# Patient Record
(demographics unavailable — no encounter records)

---

## 2025-06-18 NOTE — HISTORY OF PRESENT ILLNESS
[Back Pain] : back pain [Joint Pain] : joint  [5] : an average pain level of 5/10 [10] : a maximum pain level of 10/10 [Burning] : burning [Stabbing] : stabbing [Sitting] : sitting [Standing] : standing [Walking] : walking [Home] : at home, [unfilled] , at the time of the visit. [Medical Office: ([facility])___] : at the medical office located in  [Verbal consent obtained from patient] : the patient, [unfilled] [FreeTextEntry1] : Interval Note:  Since last visit the pain is not  improved.  Continues to have significant pain in neck and shoulder, weakness in legs and arms. Continues gabapentin.  Reports bilateral knee pain. Denies any additional  bowel/bladder dysfunction.   HPI  Ms. GRAZYNA BEAULIEU is a 89 year F pmhx htn with significant back and knee pain.  Patient has been suffering for many years.  Has had interventions without significant improvement.  However gabapentin 400mg TID has been helpful.  Pain is so bad that patient finds it difficult to perform adls and ambulate. denies any worsening numbness, weakness, bowel/bladder dysfunction. Complains of neck pain radiating to left arm, tingling.   Previous and current pain medications/doses/effects:  gabapentin  Previous Pain Treatments:  PT  Previous Pain Injections:  knee injection  Previous Diagnostic Studies/Images:  MRI CS 6/25  Compression and both at C4-5 C5-6 secondary to hypertrophic changes of spondylosis.  This is associated with cord signal abnormality consistent with myelomalacia vs edema.  Worse at C4-5

## 2025-06-18 NOTE — ASSESSMENT
[FreeTextEntry1] : >> Imaging and Other Studies   I personally reviewed the relevant imaging.  Discussed and explained to patient the likely source of pathology and pain.  Questions answered.  >> Therapy and Other Modalities   PT  >> Medications   acetaminophen 650mg q8h prn pain (caution <3g daily)  gabapentin 400mg to BID cautioned change in mood.  Encouraged to call with any worsening mood or depression/suicidal ideations  >> Interventions   Progressive left knee pain secondary to osteoarthritis refractory to conservative treatments, will schedule left knee steroid injection r/b/a discussed  Progressive right knee pain secondary to osteoarthritis refractory to conservative treatments, will schedule right knee steroid injection r/b/a discussed   >> Consults   referral to neurosurgery for surgical evaluation  >> Discussion of Risks/Benefits/Alternatives    >Regarding any scheduled procedures:   In the event the patient is scheduled for a procedure,   I have discussed in detail with the patient that any interventional pain procedure is associated with potential risks.  The procedure may include an injection of steroids and potentially other medications (local anesthetic and normal saline) into the epidural space or surrounding tissue of the spine.  There are significant risks of this procedure which include and are not limited to infection, bleeding, worsening pain, dural puncture leading to postdural puncture headache, nerve damage, spinal cord injury, paralysis, stroke, and death.   There is a chance that the procedure does not improve their pain.   There are risks associated with the steroid being absorbed into the body systemically.  These include dysphoria, difficulty sleeping, mood swings and personality changes.  Premenopausal women may notice an irregularity in her menstrual cycle for 2-3 months following the injection.  Steroids can specifically affect patients with hypertension, diabetes, and peptic ulcers.  The procedure may cause a temporary increase in blood pressure and blood pressure, and may adversely affect a peptic ulcer.  Other, more rare complications, include avascular necrosis of joints, glaucoma and worsening of osteoporosis.   I have discussed the risks of the procedure at length with the patient, and the potential benefits of pain relief.  I have offered alternatives to the procedure.  All questions were answered.   The patient expressed understanding and wishes to proceed with the procedure.   > Longitudinal management of Complex Painful condition   The patient is being managed for a complex condition that requires ongoing management.  The nature of this condition demands nuanced approach to treatment.  The seriousness of the condition necessitates an in-depth and focused approach to management and coordination with other healthcare professionals.    This visit involves intricate evaluation and management of the patient's condition.  The complexity of the visit was due to the need for detailed assessment of the current state, consideration of potential complications and a careful balancing of treatment options to management the chronic condition effectively.   As detailed above, the patient has a chronic significant painful condition that requires regular and detailed management.  The condition's impact on the patient's quality of life and health is substantial and necessitates a comprehensive and tailored approach   >> Conclusion   There were no barriers to communication. Informed patient that I would be available for any additional questions. Patient was instructed to call with any worsening symptoms including severe pain, new numbness/weakness, or changes in the bowel/bladder function. Discussed role of nsaids in pain management and all relevant risks, if patient is continuing to require after 4 weeks the patient should f/u for alternative treatment. Instructed patient to maintain pain diary to monitor pain level, mobility, and function.   I explained to patient benefits and limitation of TeleMedicine visits  Patient understands that limitations include inability to perform comprehensive physical exam, which may lead to potential diagnostic inconsistencies.    Any scheduled procedures are based on history, imaging and limited physical exam performed on TeleHealth visit.  If necessary, additional focal physical exam will be performed on date of procedure  Patient understands that diagnosis and treatment may be limited by these inconsistencies and patient agrees to proceed with care plan

## 2025-06-18 NOTE — PHYSICAL EXAM
[Normal] : Well developed, in no acute distress, alert and oriented to person, place and time [de-identified] :  Constitutional: Normal, well developed, no acute distress on audio/video examination Eyes: Symmetric, External structures on video examination ENT: Lips, mucosa and tongue normal on video examination Oropharynx: Lips normal, symmetric, no external lesions appreciated appreciated on video examination Respiratory: Non-labored breathing, no audible wheezes appreciated on audio/video examination Vascular: No cyanosis appreciated or edema appreciated on video examination GI:  no jaundice appreciated on video examination Neurovascular: CN grossly intact on video/audio examination, alert MSK: Normal muscle bulk on video examination

## 2025-07-16 NOTE — HISTORY OF PRESENT ILLNESS
[de-identified] : GRAZYNA BEAULIEU is an left handed 89 year old female with a PMH of hypertension, hyperparathyroidism, anemia, CKD stage 3, pre DM, who presents to the office today for neurosurgical consultation due to cervical and lumbar radiculopathy and MRI findings concerning for cervical myelomalacia.   The pain is characterized as burning and stabbing in nature.  It started one year ago. Her mobility decreased in 2023 after she was diagnosed with COVID. She became wheelchair bound and previously used a walker prior. She is not able to walk due to pain in the knees. It is exacerbated by sitting, standing, walking and relieved by no factors.  It radiates throughout arms. Denies neck pain. There has been no known trauma precipitating the pain.  She notes dexterity issues with right hand. The patient denies numbness of extremities and bowel and bladder incontinence. Endorses weakness of extremities, and gait disturbance.  She has tried pain medications including Gabapentin in the last months without relief.  She has undergone imaging in the form of MRI cervical spine which revealed cord compression and both at C4-C5 and C5-C6 secondary to hypertrophic changes of spondylosis. This is associated with cord signal abnormality consistent with myelomalacia vs edema. Worse at C4-C5. Multilevel foraminal stenosis ranging up to severe at multiple levels (see detailed report below) MRI lumbar spine detailed below.   Meds: Gabapentin 400 mg BID, Breo Ellipta, Tylenol, albuterol, montelukast,     Allergies: antivert, aspirin, plavix, potassium chloride, percocet, dramamine

## 2025-07-16 NOTE — PHYSICAL EXAM
[General Appearance - Alert] : alert [General Appearance - In No Acute Distress] : in no acute distress [Fluency] : fluency intact [Comprehension] : comprehension intact [Motor Strength] : muscle strength was normal in all four extremities [Sensation Tactile Decrease] : light touch was intact [Abnormal Walk] : normal gait [3+] : Brachioradialis left 3+ [2+] : Ankle jerk left 2+ [Moody] : Omody's sign was demonstrated [FreeTextEntry6] : right lower extremity 3, left lower extremity 3. bilateral knee pain  [FreeTextEntry8] : wheelchair bound

## 2025-07-16 NOTE — REASON FOR VISIT
[New Patient Visit] : a new patient visit [Interpreters_IDNumber] : [id number] [Interpreters_FullName] : keyonna shafer

## 2025-07-16 NOTE — HISTORY OF PRESENT ILLNESS
[de-identified] : GRAZYNA BEAULIEU is an left handed 89 year old female with a PMH of hypertension, hyperparathyroidism, anemia, CKD stage 3, pre DM, who presents to the office today for neurosurgical consultation due to cervical and lumbar radiculopathy and MRI findings concerning for cervical myelomalacia.   The pain is characterized as burning and stabbing in nature.  It started one year ago. Her mobility decreased in 2023 after she was diagnosed with COVID. She became wheelchair bound and previously used a walker prior. She is not able to walk due to pain in the knees. It is exacerbated by sitting, standing, walking and relieved by no factors.  It radiates throughout arms. Denies neck pain. There has been no known trauma precipitating the pain.  She notes dexterity issues with right hand. The patient denies numbness of extremities and bowel and bladder incontinence. Endorses weakness of extremities, and gait disturbance.  She has tried pain medications including Gabapentin in the last months without relief.  She has undergone imaging in the form of MRI cervical spine which revealed cord compression and both at C4-C5 and C5-C6 secondary to hypertrophic changes of spondylosis. This is associated with cord signal abnormality consistent with myelomalacia vs edema. Worse at C4-C5. Multilevel foraminal stenosis ranging up to severe at multiple levels (see detailed report below) MRI lumbar spine detailed below.   Meds: Gabapentin 400 mg BID, Breo Ellipta, Tylenol, albuterol, montelukast,     Allergies: antivert, aspirin, plavix, potassium chloride, percocet, dramamine

## 2025-07-16 NOTE — ASSESSMENT
[FreeTextEntry1] : GRAZYNA BEAULIEU is an 89 year old female with a PMH of hypertension who presents to the office today for neurosurgical consultation due to cervical myelopathy.  I reviewed on the remote website her imaging in the office today. Her cervical spine MRI demonstrates multilevel severe cervical stenosis, most significant at C3-4, C4-5, and C5-6 where there is severe central canal stenosis and resulting spinal cord compression with visible spinal cord myelomalacia.   I have discussed the natural history and treatment options for cervical myelopathy with the patient. I explained the indications for observation, conservative management, medical management, physical therapy, pain management approaches and surgery.  We had a long discussion regarding her current condition. She has severe bilateral knee pain which is significantly limiting her ability to stand. I believe she would first benefit from pain management consideration of knee injections which will then hopefully allow her to work with physical therapy. This will allow her to condition herself better and hopefully help her overall mobility.   I explained that surgery for cervical myelopathy is to halt the disease and not guarantee reversal of her limited mobility. I want them to clearly understand that if we were to proceed with surgery, it does not guarantee that she will be able to walk again. That is why I stressed the importance of overall body conditioning and optimization of her severe bilateral knee pain which is significantly liming her.   We will follow up in a few weeks for reassessment via TEB.  I spent 60 minutes relative to this patient encounter.

## 2025-07-16 NOTE — REASON FOR VISIT
[New Patient Visit] : a new patient visit [Interpreters_IDNumber] : [id number] [Interpreters_FullName] : keyonna shafer

## 2025-07-16 NOTE — REASON FOR VISIT
[New Patient Visit] : a new patient visit [Interpreters_IDNumber] : [id number] [Interpreters_FullName] : keyonna shafer

## 2025-07-16 NOTE — HISTORY OF PRESENT ILLNESS
[de-identified] : GRAZYNA BEAULIEU is an left handed 89 year old female with a PMH of hypertension, hyperparathyroidism, anemia, CKD stage 3, pre DM, who presents to the office today for neurosurgical consultation due to cervical and lumbar radiculopathy and MRI findings concerning for cervical myelomalacia.   The pain is characterized as burning and stabbing in nature.  It started one year ago. Her mobility decreased in 2023 after she was diagnosed with COVID. She became wheelchair bound and previously used a walker prior. She is not able to walk due to pain in the knees. It is exacerbated by sitting, standing, walking and relieved by no factors.  It radiates throughout arms. Denies neck pain. There has been no known trauma precipitating the pain.  She notes dexterity issues with right hand. The patient denies numbness of extremities and bowel and bladder incontinence. Endorses weakness of extremities, and gait disturbance.  She has tried pain medications including Gabapentin in the last months without relief.  She has undergone imaging in the form of MRI cervical spine which revealed cord compression and both at C4-C5 and C5-C6 secondary to hypertrophic changes of spondylosis. This is associated with cord signal abnormality consistent with myelomalacia vs edema. Worse at C4-C5. Multilevel foraminal stenosis ranging up to severe at multiple levels (see detailed report below) MRI lumbar spine detailed below.   Meds: Gabapentin 400 mg BID, Breo Ellipta, Tylenol, albuterol, montelukast,     Allergies: antivert, aspirin, plavix, potassium chloride, percocet, dramamine

## 2025-07-16 NOTE — PHYSICAL EXAM
[General Appearance - Alert] : alert [General Appearance - In No Acute Distress] : in no acute distress [Fluency] : fluency intact [Comprehension] : comprehension intact [Motor Strength] : muscle strength was normal in all four extremities [Sensation Tactile Decrease] : light touch was intact [Abnormal Walk] : normal gait [3+] : Brachioradialis left 3+ [2+] : Ankle jerk left 2+ [Moody] : Moody's sign was demonstrated [FreeTextEntry6] : right lower extremity 3, left lower extremity 3. bilateral knee pain  [FreeTextEntry8] : wheelchair bound